# Patient Record
Sex: MALE | Race: BLACK OR AFRICAN AMERICAN | NOT HISPANIC OR LATINO | URBAN - METROPOLITAN AREA
[De-identification: names, ages, dates, MRNs, and addresses within clinical notes are randomized per-mention and may not be internally consistent; named-entity substitution may affect disease eponyms.]

---

## 2024-10-04 ENCOUNTER — PATIENT MESSAGE (OUTPATIENT)
Dept: ADMINISTRATIVE | Facility: OTHER | Age: 34
End: 2024-10-04
Payer: OTHER GOVERNMENT

## 2024-10-07 ENCOUNTER — OFFICE VISIT (OUTPATIENT)
Dept: OPHTHALMOLOGY | Facility: CLINIC | Age: 34
End: 2024-10-07
Payer: OTHER GOVERNMENT

## 2024-10-07 DIAGNOSIS — H52.13 MYOPIA OF BOTH EYES WITH ASTIGMATISM: ICD-10-CM

## 2024-10-07 DIAGNOSIS — Z01.00 ENCOUNTER FOR EYE EXAM: Primary | ICD-10-CM

## 2024-10-07 DIAGNOSIS — H52.203 MYOPIA OF BOTH EYES WITH ASTIGMATISM: ICD-10-CM

## 2024-10-07 PROCEDURE — 99999 PR PBB SHADOW E&M-EST. PATIENT-LVL II: CPT | Mod: PBBFAC,,, | Performed by: OPTOMETRIST

## 2024-10-07 PROCEDURE — 92310 CONTACT LENS FITTING OU: CPT | Mod: CSM,,, | Performed by: OPTOMETRIST

## 2024-10-07 PROCEDURE — 99212 OFFICE O/P EST SF 10 MIN: CPT | Mod: PBBFAC | Performed by: OPTOMETRIST

## 2024-10-07 NOTE — PROGRESS NOTES
HPI    1. Hx of Glaucoma Mother  2. Dx of thick optical nerves    Vision changes since last eye exam?: Pt states his right eye has bene a   little more blurry and pt wanted to be advised it was related to fit or a   change in prescription. Pt wears CTL full time and eyeglasses as needed.   Pt is using Bioture rewetting eyedrops as needed.    Any eye pain today: Pt denies any eye pain.    Other ocular symptoms: None noticed by pt.    Interested in contact lens fitting today? Yes. Pt states he currently   wearing Air optix but would like to be refitted for [employer] and or   what Dr. Tafoya advises.      Last edited by Nisha Perdomo on 10/7/2024 10:24 AM.            Assessment /Plan     For exam results, see Encounter Report.    Encounter for eye exam    Myopia of both eyes with astigmatism  Eyeglass Final Rx       Eyeglass Final Rx         Sphere Cylinder Axis    Right -6.75 +1.50 095    Left -7.50 +1.75 070      Type: SVL    Expiration Date: 10/7/2025   PD 68                 Contact Lens Final Rx       Final Contact Lens Rx         Brand Base Curve Diameter Sphere Cylinder Axis    Right Acuvue Oasys for Astigmatism 8.6 14.5 -5.50 -1.25 180    Left Acuvue Oasys for Astigmatism 8.6 14.5 -5.50 -1.25 170      Expiration Date: 10/7/2025    Replacement: Every 2 weeks    Solutions: OptiFree PureMoist    Wearing Schedule: Daily Wear                  Contact lens trials fitted in office today. Contact lens hygiene reviewed. Patient able to insert the lenses themselves with minimal difficulty. Patient ok to finalize Contact lens after 1 week of wear. RTC if still having difficulty with CTL trial after 1 week.       RTC 1 yr for dilated eye exam or sooner if any changes to vision.   Discussed above and answered questions.

## 2024-10-12 ENCOUNTER — PATIENT MESSAGE (OUTPATIENT)
Dept: OPTOMETRY | Facility: CLINIC | Age: 34
End: 2024-10-12
Payer: OTHER GOVERNMENT

## 2024-12-12 ENCOUNTER — PATIENT MESSAGE (OUTPATIENT)
Dept: RESEARCH | Facility: HOSPITAL | Age: 34
End: 2024-12-12
Payer: OTHER GOVERNMENT